# Patient Record
Sex: FEMALE | Race: WHITE
[De-identification: names, ages, dates, MRNs, and addresses within clinical notes are randomized per-mention and may not be internally consistent; named-entity substitution may affect disease eponyms.]

---

## 2017-01-05 ENCOUNTER — HOSPITAL ENCOUNTER (OUTPATIENT)
Dept: HOSPITAL 45 - C.LAB1850 | Age: 62
Discharge: HOME | End: 2017-01-05
Attending: INTERNAL MEDICINE
Payer: COMMERCIAL

## 2017-01-05 DIAGNOSIS — D64.9: ICD-10-CM

## 2017-01-05 DIAGNOSIS — N39.0: Primary | ICD-10-CM

## 2017-01-05 LAB
APPEARANCE UR: CLEAR
BILIRUB UR-MCNC: (no result) MG/DL
COLOR UR: YELLOW
MANUAL MICROSCOPIC REQUIRED?: NO
NITRITE UR QL STRIP: (no result)
PH UR STRIP: 6 [PH] (ref 4.5–7.5)
REVIEW REQ?: YES
SP GR UR STRIP: 1.02 (ref 1–1.03)
URINE BILL WITH OR WITHOUT MIC: (no result)
URINE EPITHELIAL CELL AUTO: (no result) /LPF (ref 0–5)
UROBILINOGEN UR-MCNC: (no result) MG/DL

## 2017-03-06 ENCOUNTER — HOSPITAL ENCOUNTER (OUTPATIENT)
Dept: HOSPITAL 45 - C.ULTR | Age: 62
Discharge: HOME | End: 2017-03-06
Attending: INTERNAL MEDICINE
Payer: COMMERCIAL

## 2017-03-06 DIAGNOSIS — R10.9: Primary | ICD-10-CM

## 2017-03-06 DIAGNOSIS — K76.0: ICD-10-CM

## 2017-03-06 NOTE — DIAGNOSTIC IMAGING REPORT
ABDOMINAL ULTRASOUND COMPLETE



HISTORY:      Generalized abdominal pain..



COMPARISON:  None.



FINDINGS:



Pancreas: The pancreatic tail and body are obscured by overlying bowel gas. The

remaining portions of the pancreas are within normal limits.



Liver: The liver is echogenic consistent with fatty change.



Gallbladder: The gallbladder is surgically absent.



CBD: 1.5 cm.



Kidneys: No hydronephrosis.



Spleen: Normal in size.



Aorta: Normal in caliber.





IMPRESSION: 



1. Cholecystectomy. Common bile duct is distended up to 1.5 cm. This could be

due to the patient's postcholecystectomy state. Recommend correlation with LFTs

to exclude an underlying obstructive process.

2. Hepatic steatosis.

3. The pancreas was not well visualized due to overlying bowel gas







Electronically signed by:  Krish Michel M.D.

3/6/2017 9:00 AM



Dictated Date/Time:  3/6/2017 8:58 AM

## 2017-03-07 ENCOUNTER — HOSPITAL ENCOUNTER (OUTPATIENT)
Dept: HOSPITAL 45 - C.LAB | Age: 62
Discharge: HOME | End: 2017-03-07
Attending: INTERNAL MEDICINE
Payer: COMMERCIAL

## 2017-03-07 DIAGNOSIS — E55.9: ICD-10-CM

## 2017-03-07 DIAGNOSIS — E78.5: ICD-10-CM

## 2017-03-07 DIAGNOSIS — R10.9: ICD-10-CM

## 2017-03-07 DIAGNOSIS — M34.9: ICD-10-CM

## 2017-03-07 DIAGNOSIS — E53.8: Primary | ICD-10-CM

## 2017-03-07 DIAGNOSIS — E03.9: ICD-10-CM

## 2017-03-07 DIAGNOSIS — E11.9: ICD-10-CM

## 2017-03-07 LAB
ALBUMIN/GLOB SERPL: 1.2 {RATIO} (ref 0.9–2)
ALP SERPL-CCNC: 78 U/L (ref 45–117)
ALT SERPL-CCNC: 18 U/L (ref 12–78)
AMYLASE SERPL-CCNC: 11 U/L (ref 25–115)
ANION GAP SERPL CALC-SCNC: 7 MMOL/L (ref 3–11)
AST SERPL-CCNC: 9 U/L (ref 15–37)
BUN SERPL-MCNC: 16 MG/DL (ref 7–18)
BUN/CREAT SERPL: 18.5 (ref 10–20)
CALCIUM SERPL-MCNC: 8.3 MG/DL (ref 8.5–10.1)
CHLORIDE SERPL-SCNC: 112 MMOL/L (ref 98–107)
CHOLEST/HDLC SERPL: 3.3 {RATIO}
CO2 SERPL-SCNC: 26 MMOL/L (ref 21–32)
CREAT SERPL-MCNC: 0.87 MG/DL (ref 0.6–1.2)
GLOBULIN SER-MCNC: 3 GM/DL (ref 2.5–4)
GLUCOSE SERPL-MCNC: 81 MG/DL (ref 70–99)
GLUCOSE UR QL: 72 MG/DL
KETONES UR QL STRIP: 130 MG/DL
NITRITE UR QL STRIP: 176 MG/DL (ref 0–150)
PH UR: 237 MG/DL (ref 0–200)
POTASSIUM SERPL-SCNC: 4 MMOL/L (ref 3.5–5.1)
SODIUM SERPL-SCNC: 145 MMOL/L (ref 136–145)
TSH SERPL-ACNC: 1.85 UIU/ML (ref 0.3–4.5)
VERY LOW DENSITY LIPOPROT CALC: 35 MG/DL

## 2017-03-08 LAB — EST. AVERAGE GLUCOSE BLD GHB EST-MCNC: 120 MG/DL

## 2017-05-08 ENCOUNTER — HOSPITAL ENCOUNTER (OUTPATIENT)
Dept: HOSPITAL 45 - C.MAMM | Age: 62
Discharge: HOME | End: 2017-05-08
Attending: OBSTETRICS & GYNECOLOGY
Payer: COMMERCIAL

## 2017-05-08 DIAGNOSIS — Z12.31: Primary | ICD-10-CM

## 2017-05-09 NOTE — MAMMOGRAPHY REPORT
BILATERAL DIGITAL SCREENING MAMMOGRAM TOMOSYNTHESIS WITH CAD: 5/8/2017

CLINICAL HISTORY: Routine screening.  Patient has no complaints.  





TECHNIQUE:  Breast tomosynthesis in addition to standard 2D mammography was performed. Current study
 was also evaluated with a Computer Aided Detection (CAD) system.  



COMPARISON: Comparison is made to exams dated:  8/26/2015 mammogram, 6/30/2014 mammogram, 3/12/2013 
mammogram - [facility], 11/4/2011 mammogram, and 3/15/2010 mammogram.   



BREAST COMPOSITION:  There are scattered areas of fibroglandular density in both breasts.  



FINDINGS: A linear scar marker overlies the 12:00 left breast, denoting an area of prior excisional 
biopsy.  There is expected underlying architectural distortion.  No suspicious mass, unexpected arch
itectural distortion or cluster of suspicious microcalcifications is seen bilaterally.  



IMPRESSION:  ACR BI-RADS CATEGORY 1: NEGATIVE

There is no mammographic evidence of malignancy. A 1 year screening mammogram is recommended.  The p
atient will receive written notification of the results.  





Approximately 10% of breast cancers are not detected with mammography. A negative mammographic repor
t should not delay biopsy if a clinically suggestive mass is present.



Yareli Henderson M.D.          

ay/:5/8/2017 22:01:42  



Imaging Technologist: ISABEL Angel M, [facility]

letter sent: Normal 1/2  

BI-RADS Code: ACR BI-RADS Category 1: Negative

## 2017-09-05 ENCOUNTER — HOSPITAL ENCOUNTER (OUTPATIENT)
Dept: HOSPITAL 45 - C.LAB1850 | Age: 62
Discharge: HOME | End: 2017-09-05
Attending: INTERNAL MEDICINE
Payer: COMMERCIAL

## 2017-09-05 DIAGNOSIS — M34.9: Primary | ICD-10-CM

## 2017-09-05 DIAGNOSIS — E03.9: ICD-10-CM

## 2017-09-05 DIAGNOSIS — E78.5: ICD-10-CM

## 2017-09-05 DIAGNOSIS — E11.9: ICD-10-CM

## 2017-09-05 LAB
ALBUMIN/GLOB SERPL: 0.9 {RATIO} (ref 0.9–2)
ALP SERPL-CCNC: 108 U/L (ref 45–117)
ALT SERPL-CCNC: 18 U/L (ref 12–78)
ANION GAP SERPL CALC-SCNC: 4 MMOL/L (ref 3–11)
AST SERPL-CCNC: 12 U/L (ref 15–37)
BUN SERPL-MCNC: 19 MG/DL (ref 7–18)
BUN/CREAT SERPL: 16.8 (ref 10–20)
CALCIUM SERPL-MCNC: 8.7 MG/DL (ref 8.5–10.1)
CHLORIDE SERPL-SCNC: 109 MMOL/L (ref 98–107)
CHOLEST/HDLC SERPL: 5.3 {RATIO}
CO2 SERPL-SCNC: 26 MMOL/L (ref 21–32)
CREAT SERPL-MCNC: 1.1 MG/DL (ref 0.6–1.2)
EST. AVERAGE GLUCOSE BLD GHB EST-MCNC: 134 MG/DL
GLOBULIN SER-MCNC: 3.6 GM/DL (ref 2.5–4)
GLUCOSE SERPL-MCNC: 135 MG/DL (ref 70–99)
GLUCOSE UR QL: 57 MG/DL
NITRITE UR QL STRIP: 515 MG/DL (ref 0–150)
PH UR: 301 MG/DL (ref 0–200)
POTASSIUM SERPL-SCNC: 4.1 MMOL/L (ref 3.5–5.1)
SODIUM SERPL-SCNC: 139 MMOL/L (ref 136–145)
TSH SERPL-ACNC: 3.92 UIU/ML (ref 0.3–4.5)

## 2018-03-20 ENCOUNTER — HOSPITAL ENCOUNTER (OUTPATIENT)
Dept: HOSPITAL 45 - C.NEUR | Age: 63
Discharge: HOME | End: 2018-03-20
Attending: PHYSICIAN ASSISTANT
Payer: COMMERCIAL

## 2018-03-20 DIAGNOSIS — R56.9: Primary | ICD-10-CM

## 2018-03-20 NOTE — EEG PROCEDURE NOTE
EEG Procedure Note


Date of Service


Mar 20, 2018.





Start / End Times


Start Time:  1:49 PM


End Time:  2:10 PM





Referring Physician


ROLANDO Bolton





History


This is a 62-year-old female with seizure-like activity.  EEG for further 

evaluation of possible seizure etiology.





Home Medication List


Scheduled


Ascorbic Acid (Vitamin C *), 4 TABLETS PO 4XWK


Calcium Carbonate-Vitamin D W/ (Caltrate 600 Plus), 2 TAB PO DAILY


Cholecalciferol (Vitamin D 1000 Unit), 2,000 INTER.UNIT PO QID


Colesevelam Hcl (Welchol), 3 TABS PO BID


Cyanocobalamin (Vitamin B-12 Unkown Dose), 2 TABLETS SL DAILY


Duloxetine Hcl (Cymbalta), 90 MG PO DAILY


Esomeprazole Magnesium (Nexium), 40 MG PO BID


Estradiol (Estrace), 1 MG PO QAM


Fenofibrate (Tricor), 1 TAB PO DAILY


Ferrous Gluconate (Iron Supplement *), 300 MG PO DAILY


Fish Oil ([place]-3), 1 CAP PO DAILY


Folic Acid (Folvite), 1 MG PO DAILY


Gabapentin (Neurontin), 1 CAP PO TID


Hydrocodone/Acetaminophen 7.5MG/500MG (Lortab 7.5MG/500MG), 1 TAB PO TID PRN


Ipratropium/Albuterol (Combivent), 2 PUFFS INH QID


Levothyroxine Sodium (Synthroid), 50 MCG PO QAM


Magnesium Oxide (Mag-Ox), 400 MG PO DAILY


Metformin Hcl (Glucophage), 1,000 MG PO BID


Mycophenolate Mofetil (Cellcept), 1 CAP PO BID


Ropinirole Hydrochloride (Requip), 2 MG PO DAILY PRN


Topiramate (Topamax), 300 MG PO BID





Description


This is a 21 electrode EEG with a single channel dedicated to limited EKG. The 

electrodes were placed in accordance with the International 10-20 system.





At the start of the recording the patient was in an awake state. Background was 

well organized and composed of symmetric mixed alpha and beta frequencies. 

There was a symmetric well-formed moderate amplitude 8-9 Hz posterior dominant 

rhythm that was reactive to eye opening and closure.  Hyperventilation was not 

done. Intermittent photic stimulation at various frequencies produced no 

abnormalities.  There was no state changes or sleep transients.





Interpretation


This is a normal awake only routine EEG. 





There was no electrographic seizures or epileptiform discharges.





Clinical Correlation


A normal EEG does not rule out epilepsy if there is a strong clinical suspicion.

## 2018-03-21 ENCOUNTER — HOSPITAL ENCOUNTER (OUTPATIENT)
Dept: HOSPITAL 45 - C.CTS | Age: 63
Discharge: HOME | End: 2018-03-21
Attending: PHYSICIAN ASSISTANT
Payer: COMMERCIAL

## 2018-03-21 DIAGNOSIS — E03.9: Primary | ICD-10-CM

## 2018-03-21 DIAGNOSIS — G20: ICD-10-CM

## 2018-03-21 DIAGNOSIS — R56.9: ICD-10-CM

## 2018-03-21 LAB
ALBUMIN SERPL-MCNC: 3.8 GM/DL (ref 3.4–5)
ALP SERPL-CCNC: 146 U/L (ref 45–117)
ALT SERPL-CCNC: 9 U/L (ref 12–78)
AST SERPL-CCNC: 10 U/L (ref 15–37)
BASOPHILS # BLD: 0.01 K/UL (ref 0–0.2)
BASOPHILS NFR BLD: 0.1 %
BUN SERPL-MCNC: 23 MG/DL (ref 7–18)
CALCIUM SERPL-MCNC: 8.6 MG/DL (ref 8.5–10.1)
CO2 SERPL-SCNC: 22 MMOL/L (ref 21–32)
CREAT SERPL-MCNC: 1.23 MG/DL (ref 0.6–1.2)
EOS ABS #: 0.16 K/UL (ref 0–0.5)
EOSINOPHIL NFR BLD AUTO: 217 K/UL (ref 130–400)
GLUCOSE SERPL-MCNC: 152 MG/DL (ref 70–99)
HCT VFR BLD CALC: 37.6 % (ref 37–47)
HGB BLD-MCNC: 11.8 G/DL (ref 12–16)
IG#: 0.02 K/UL (ref 0–0.02)
IMM GRANULOCYTES NFR BLD AUTO: 27.5 %
LYMPHOCYTES # BLD: 2.24 K/UL (ref 1.2–3.4)
MCH RBC QN AUTO: 30.5 PG (ref 25–34)
MCHC RBC AUTO-ENTMCNC: 31.4 G/DL (ref 32–36)
MCV RBC AUTO: 97.2 FL (ref 80–100)
MONO ABS #: 0.97 K/UL (ref 0.11–0.59)
MONOCYTES NFR BLD: 11.9 %
NEUT ABS #: 4.74 K/UL (ref 1.4–6.5)
NEUTROPHILS # BLD AUTO: 2 %
NEUTROPHILS NFR BLD AUTO: 58.3 %
PMV BLD AUTO: 11.2 FL (ref 7.4–10.4)
POTASSIUM SERPL-SCNC: 3.8 MMOL/L (ref 3.5–5.1)
PROT SERPL-MCNC: 7.4 GM/DL (ref 6.4–8.2)
RED CELL DISTRIBUTION WIDTH CV: 14.6 % (ref 11.5–14.5)
RED CELL DISTRIBUTION WIDTH SD: 51.6 FL (ref 36.4–46.3)
SODIUM SERPL-SCNC: 142 MMOL/L (ref 136–145)
WBC # BLD AUTO: 8.14 K/UL (ref 4.8–10.8)

## 2018-03-21 NOTE — DIAGNOSTIC IMAGING REPORT
CT HEAD WITHOUT CONTRAST (CT)



CLINICAL HISTORY: G20 [id number] Seizure-like [id number]    



COMPARISON STUDY:  3/24/2016



TECHNIQUE:  Axial CT of the brain is performed from the vertex to the skull

base. IV contrast was not administered for this examination. A dose lowering

technique was utilized adhering to the principles of ALARA.

 



CT DOSE: 788.63 mGycm



FINDINGS:



No intra or extra-axial mass lesions are visualized. There is no CT evidence of

acute cortical infarction. There is no evidence of midline shift. There is no

acute  hemorrhage. No calvarial fractures are visualized. 

There are minor white matter hypodensities likely on a small vessel basis.

There is no evidence of pathologic ventricular dilatation. There is minor

frontal lobe atrophy

There is no evidence of acute sinusitis



IMPRESSION: No acute intracranial findings







Electronically signed by:  Sherwin Montejo M.D.

3/21/2018 1:05 PM



Dictated Date/Time:  3/21/2018 1:04 PM

## 2018-05-10 ENCOUNTER — HOSPITAL ENCOUNTER (OUTPATIENT)
Dept: HOSPITAL 45 - C.LAB1850 | Age: 63
Discharge: HOME | End: 2018-05-10
Attending: OBSTETRICS & GYNECOLOGY
Payer: COMMERCIAL

## 2018-05-10 DIAGNOSIS — R19.00: Primary | ICD-10-CM
